# Patient Record
Sex: MALE | Race: WHITE | NOT HISPANIC OR LATINO
[De-identification: names, ages, dates, MRNs, and addresses within clinical notes are randomized per-mention and may not be internally consistent; named-entity substitution may affect disease eponyms.]

---

## 2024-06-02 ENCOUNTER — APPOINTMENT (OUTPATIENT)
Dept: MRI IMAGING | Facility: CLINIC | Age: 29
End: 2024-06-02
Payer: COMMERCIAL

## 2024-06-02 ENCOUNTER — APPOINTMENT (OUTPATIENT)
Dept: ORTHOPEDIC SURGERY | Facility: CLINIC | Age: 29
End: 2024-06-02
Payer: COMMERCIAL

## 2024-06-02 VITALS — HEIGHT: 71 IN | BODY MASS INDEX: 23.8 KG/M2 | WEIGHT: 170 LBS

## 2024-06-02 DIAGNOSIS — Z78.9 OTHER SPECIFIED HEALTH STATUS: ICD-10-CM

## 2024-06-02 DIAGNOSIS — S43.101A UNSPECIFIED DISLOCATION OF RIGHT ACROMIOCLAVICULAR JOINT, INITIAL ENCOUNTER: ICD-10-CM

## 2024-06-02 PROBLEM — Z00.00 ENCOUNTER FOR PREVENTIVE HEALTH EXAMINATION: Status: ACTIVE | Noted: 2024-06-02

## 2024-06-02 PROCEDURE — 99203 OFFICE O/P NEW LOW 30 MIN: CPT

## 2024-06-02 PROCEDURE — 73030 X-RAY EXAM OF SHOULDER: CPT | Mod: RT

## 2024-06-02 PROCEDURE — 73221 MRI JOINT UPR EXTREM W/O DYE: CPT | Mod: RT

## 2024-06-03 NOTE — ASSESSMENT
[FreeTextEntry1] : Right shoulder exam: The patient presents with no apparent distress. Neurovascularly intact. Sensation intact to right upper extremity. No scars, rashes, or abrasions. 2+ pulses to the distal radius.Tender to palpation at AC joint with crepitus to palpation. AROM FE 80 ABD 70 ER 65 IR L1. RC strength 4+/5 secondary to pain. 5/5 deltoid strength. Cannot perform scarf exam. Pain with internal rotation.   Right shoulder xrays taken today in office, 5 views WB: AC joint deformity without fracture. No GHOA. GH joint is reduced without signs of dislocation. No obvious tumors, masses, or lesions

## 2024-06-03 NOTE — DISCUSSION/SUMMARY
[de-identified] : The patient has a right shoulder AC joint separation.  The patient will go for MRI to evaluate AC joint separation.  He will use advil as instructed.  He will use a sling for comfort and ROM the shoulder gently.  He will ice as needed.  He will call once MRI is completed.

## 2024-06-03 NOTE — HISTORY OF PRESENT ILLNESS
[de-identified] : Patient presents today with right shoulder pain after playing football in his backyard and fell on 6/1/24. Patient states he has superior, stabbing pain radiating into his shoulder blade. Patient states he has severe limited ROM. Patient states he feels increased pain with raising his arm and holding objects. Patient admits to taking Advil for pain with some relief. Patient denies recent imaging. The patient's pain is located to his AC joint and he has a deformity to inspection. He denies numbness or paresthesias.

## 2024-06-05 ENCOUNTER — NON-APPOINTMENT (OUTPATIENT)
Age: 29
End: 2024-06-05